# Patient Record
Sex: MALE | Race: WHITE | NOT HISPANIC OR LATINO | Employment: OTHER | URBAN - METROPOLITAN AREA
[De-identification: names, ages, dates, MRNs, and addresses within clinical notes are randomized per-mention and may not be internally consistent; named-entity substitution may affect disease eponyms.]

---

## 2021-03-08 ENCOUNTER — TELEPHONE (OUTPATIENT)
Dept: INTERNAL MEDICINE | Facility: CLINIC | Age: 46
End: 2021-03-08

## 2021-03-08 DIAGNOSIS — Z12.5 SCREENING FOR PROSTATE CANCER: ICD-10-CM

## 2021-03-08 DIAGNOSIS — Z00.00 WELLNESS EXAMINATION: Primary | ICD-10-CM

## 2021-03-08 DIAGNOSIS — Z11.59 NEED FOR HEPATITIS C SCREENING TEST: ICD-10-CM

## 2021-03-23 RX ORDER — FLUTICASONE PROPIONATE 50 MCG
2 SPRAY, SUSPENSION (ML) NASAL DAILY
COMMUNITY
End: 2021-04-22

## 2021-03-23 RX ORDER — HYDROCODONE BITARTRATE AND ACETAMINOPHEN 5; 325 MG/1; MG/1
1 TABLET ORAL EVERY 12 HOURS
COMMUNITY
End: 2021-04-22

## 2021-03-23 RX ORDER — ACETAMINOPHEN 500 MG
500 TABLET ORAL EVERY 6 HOURS PRN
COMMUNITY
End: 2021-04-22

## 2021-03-23 RX ORDER — CETIRIZINE HYDROCHLORIDE 10 MG/1
10 TABLET ORAL DAILY
COMMUNITY

## 2021-04-21 LAB
ALBUMIN SERPL-MCNC: 4.6 G/DL (ref 3.5–5.2)
ALBUMIN/GLOB SERPL: 2.1 G/DL
ALP SERPL-CCNC: 66 U/L (ref 39–117)
ALT SERPL-CCNC: 23 U/L (ref 1–41)
AST SERPL-CCNC: 22 U/L (ref 1–40)
BILIRUB SERPL-MCNC: 0.5 MG/DL (ref 0–1.2)
BUN SERPL-MCNC: 17 MG/DL (ref 6–20)
BUN/CREAT SERPL: 17.5 (ref 7–25)
CALCIUM SERPL-MCNC: 9.4 MG/DL (ref 8.6–10.5)
CHLORIDE SERPL-SCNC: 101 MMOL/L (ref 98–107)
CHOLEST SERPL-MCNC: 218 MG/DL (ref 0–200)
CHOLEST/HDLC SERPL: 4.64 {RATIO}
CO2 SERPL-SCNC: 29.2 MMOL/L (ref 22–29)
CREAT SERPL-MCNC: 0.97 MG/DL (ref 0.76–1.27)
GLOBULIN SER CALC-MCNC: 2.2 GM/DL
GLUCOSE SERPL-MCNC: 104 MG/DL (ref 65–99)
HCV AB S/CO SERPL IA: <0.1 S/CO RATIO (ref 0–0.9)
HDLC SERPL-MCNC: 47 MG/DL (ref 40–60)
LDLC SERPL CALC-MCNC: 148 MG/DL (ref 0–100)
POTASSIUM SERPL-SCNC: 4.2 MMOL/L (ref 3.5–5.2)
PROT SERPL-MCNC: 6.8 G/DL (ref 6–8.5)
PSA SERPL-MCNC: 1.13 NG/ML (ref 0–4)
SODIUM SERPL-SCNC: 140 MMOL/L (ref 136–145)
TRIGL SERPL-MCNC: 129 MG/DL (ref 0–150)
VLDLC SERPL CALC-MCNC: 23 MG/DL (ref 5–40)

## 2021-04-22 ENCOUNTER — OFFICE VISIT (OUTPATIENT)
Dept: INTERNAL MEDICINE | Facility: CLINIC | Age: 46
End: 2021-04-22

## 2021-04-22 VITALS
SYSTOLIC BLOOD PRESSURE: 90 MMHG | HEIGHT: 71 IN | TEMPERATURE: 97.1 F | BODY MASS INDEX: 25.9 KG/M2 | HEART RATE: 66 BPM | DIASTOLIC BLOOD PRESSURE: 68 MMHG | WEIGHT: 185 LBS

## 2021-04-22 DIAGNOSIS — Z12.11 SCREENING FOR COLON CANCER: ICD-10-CM

## 2021-04-22 DIAGNOSIS — C44.90 SKIN CANCER: ICD-10-CM

## 2021-04-22 DIAGNOSIS — Z00.00 WELLNESS EXAMINATION: Primary | ICD-10-CM

## 2021-04-22 PROCEDURE — 90471 IMMUNIZATION ADMIN: CPT | Performed by: INTERNAL MEDICINE

## 2021-04-22 PROCEDURE — 99386 PREV VISIT NEW AGE 40-64: CPT | Performed by: INTERNAL MEDICINE

## 2021-04-22 PROCEDURE — 90715 TDAP VACCINE 7 YRS/> IM: CPT | Performed by: INTERNAL MEDICINE

## 2021-04-22 NOTE — PROGRESS NOTES
Subjective        Chief Complaint   Patient presents with   • hospitals Care           Ben Mata is a 45 y.o. male who presents for    There is no problem list on file for this patient.      History of Present Illness      He had a melanoma in situ removed with Dr. Tate.  He retired from the [employer]. He feels healthier now than he has in a long time. He denies depression or anxiety.  He exercises 5-6 days per week.  Allergies   Allergen Reactions   • Ceclor [Cefaclor] Dizziness       Current Outpatient Medications on File Prior to Visit   Medication Sig Dispense Refill   • cetirizine (zyrTEC) 10 MG tablet Take 10 mg by mouth Daily.     • EPINEPHrine (EPIPEN 2-HILDA IJ) Inject  as directed.     • [DISCONTINUED] acetaminophen (Acetaminophen Extra Strength) 500 MG tablet Take 500 mg by mouth Every 6 (Six) Hours As Needed for Mild Pain .     • [DISCONTINUED] fluticasone (FLONASE) 50 MCG/ACT nasal spray 2 sprays into the nostril(s) as directed by provider Daily.     • [DISCONTINUED] HYDROcodone-acetaminophen (NORCO) 5-325 MG per tablet Take 1 tablet by mouth Every 12 (Twelve) Hours.       No current facility-administered medications on file prior to visit.       Past Medical History:   Diagnosis Date   • Anxiety    • Bee sting allergy    • Melanoma in situ (CMS/[facility])        Past Surgical History:   Procedure Laterality Date   • EAR TUBES     • SKIN BIOPSY         Family History   Problem Relation Age of Onset   • Heart disease Mother         pacemaker   • Diabetes Son    • Heart disease Maternal Grandmother    • Stroke Maternal Grandfather    • Heart disease Paternal Grandmother        Social History     Socioeconomic History   • Marital status:      Spouse name: Not on file   • Number of children: Not on file   • Years of education: Not on file   • Highest education level: Not on file   Tobacco Use   • Smoking status: Never Smoker   • Smokeless tobacco: Never Used   Vaping Use   • Vaping Use: Never used  "  Substance and Sexual Activity   • Alcohol use: Yes     Comment: 2 ounces twice per week   • Drug use: Never   • Sexual activity: Not Currently           The following portions of the patient's history were reviewed and updated as appropriate: problem list, allergies, current medications, past medical history, past family history, past social history and past surgical history.    Review of Systems   Constitutional: Negative for chills, fever and unexpected weight loss.   HENT: Negative for postnasal drip and sore throat.    Eyes: Negative for blurred vision.   Respiratory: Negative for cough, shortness of breath and wheezing.    Cardiovascular: Negative for chest pain and leg swelling.   Gastrointestinal: Negative for abdominal pain, blood in stool, nausea, vomiting and GERD.   Endocrine: Negative for polyuria.   Genitourinary: Negative for dysuria, frequency and hematuria.   Musculoskeletal: Negative for gait problem.   Skin: Negative for rash.   Allergic/Immunologic: Negative for immunocompromised state.   Neurological: Negative for weakness.   Hematological: Does not bruise/bleed easily.   Psychiatric/Behavioral: Negative for depressed mood. The patient is not nervous/anxious.        Immunization History   Administered Date(s) Administered   • Hep B, Unspecified 03/01/1996   • Hepatitis A 12/16/2002   • Hepatitis B 01/20/2011   • Tdap 01/02/2011, 04/22/2021       Objective   Vitals:    04/22/21 0951   BP: 90/68   Pulse: 66   Temp: 97.1 °F (36.2 °C)   Weight: 83.9 kg (185 lb)   Height: 180.3 cm (71\")     Body mass index is 25.8 kg/m².  Physical Exam  Vitals reviewed.   Constitutional:       Appearance: He is well-developed.   HENT:      Head: Normocephalic and atraumatic.      Mouth/Throat:      Mouth: Mucous membranes are moist.      Pharynx: Oropharynx is clear.   Eyes:      Extraocular Movements: Extraocular movements intact.      Conjunctiva/sclera: Conjunctivae normal.      Pupils: Pupils are equal, round, " and reactive to light.   Neck:      Thyroid: No thyromegaly.      Vascular: No carotid bruit.   Cardiovascular:      Rate and Rhythm: Normal rate and regular rhythm.      Heart sounds: Normal heart sounds. No murmur heard.     Pulmonary:      Effort: Pulmonary effort is normal.      Breath sounds: Normal breath sounds.   Abdominal:      General: There is no distension.      Palpations: Abdomen is soft. There is no mass.      Tenderness: There is no abdominal tenderness. There is no rebound.      Hernia: There is no hernia in the left inguinal area or right inguinal area.   Genitourinary:     Penis: Normal.       Testes: Normal.   Musculoskeletal:      Cervical back: Neck supple.   Lymphadenopathy:      Cervical: No cervical adenopathy.   Skin:     General: Skin is warm.   Neurological:      Mental Status: He is alert.   Psychiatric:         Behavior: Behavior normal.         Procedures    Assessment/Plan   Diagnoses and all orders for this visit:    1. Wellness examination (Primary)    2. Skin cancer  -     Ambulatory Referral to Dermatology    3. Screening for colon cancer  -     Ambulatory Referral For Screening Colonoscopy    Other orders  -     Tdap Vaccine Greater Than or Equal To 8yo IM        Labs reviewed. Sign for old records.     Tdap today. Set up screening cscope. He exercises well over 150 minutes per week. Get into derm. Filled out form for scouting hike this summer; no contraindications. He will let me know if needs another epi pen.    No follow-ups on file.

## 2021-05-26 ENCOUNTER — PREP FOR SURGERY (OUTPATIENT)
Dept: SURGERY | Facility: SURGERY CENTER | Age: 46
End: 2021-05-26

## 2021-05-26 DIAGNOSIS — Z12.11 ENCOUNTER FOR SCREENING FOR MALIGNANT NEOPLASM OF COLON: Primary | ICD-10-CM

## 2021-05-26 RX ORDER — SODIUM CHLORIDE 0.9 % (FLUSH) 0.9 %
3 SYRINGE (ML) INJECTION EVERY 12 HOURS SCHEDULED
Status: CANCELLED | OUTPATIENT
Start: 2021-05-26

## 2021-05-26 RX ORDER — SODIUM CHLORIDE, SODIUM LACTATE, POTASSIUM CHLORIDE, CALCIUM CHLORIDE 600; 310; 30; 20 MG/100ML; MG/100ML; MG/100ML; MG/100ML
30 INJECTION, SOLUTION INTRAVENOUS CONTINUOUS PRN
Status: CANCELLED | OUTPATIENT
Start: 2021-05-26

## 2021-05-26 RX ORDER — SODIUM CHLORIDE 0.9 % (FLUSH) 0.9 %
10 SYRINGE (ML) INJECTION AS NEEDED
Status: CANCELLED | OUTPATIENT
Start: 2021-05-26

## 2021-09-14 ENCOUNTER — OFFICE VISIT (OUTPATIENT)
Dept: INTERNAL MEDICINE | Facility: CLINIC | Age: 46
End: 2021-09-14

## 2021-09-14 VITALS — WEIGHT: 187 LBS | HEIGHT: 71 IN | TEMPERATURE: 97.8 F | BODY MASS INDEX: 26.18 KG/M2

## 2021-09-14 DIAGNOSIS — C43.22 MALIGNANT MELANOMA OF LEFT PINNA (HCC): ICD-10-CM

## 2021-09-14 DIAGNOSIS — M54.2 NECK PAIN: Primary | ICD-10-CM

## 2021-09-14 PROCEDURE — 99213 OFFICE O/P EST LOW 20 MIN: CPT | Performed by: INTERNAL MEDICINE

## 2021-09-14 RX ORDER — MELOXICAM 7.5 MG/1
7.5 TABLET ORAL DAILY
Qty: 30 TABLET | Refills: 0 | Status: SHIPPED | OUTPATIENT
Start: 2021-09-14 | End: 2023-02-22

## 2021-09-14 RX ORDER — CYCLOBENZAPRINE HCL 10 MG
10 TABLET ORAL NIGHTLY PRN
Qty: 21 TABLET | Refills: 0 | Status: SHIPPED | OUTPATIENT
Start: 2021-09-14 | End: 2023-02-22

## 2021-09-14 NOTE — PROGRESS NOTES
Subjective        Chief Complaint   Patient presents with   • Neck Pain           Ben Mata is a 46 y.o. male who presents for    Patient Active Problem List   Diagnosis   • Encounter for screening for malignant neoplasm of colon       History of Present Illness     He has left neck pain since last OV. It is a sharp pain. It is worse with moving it. It is not worse with sleeping. He denies weakness, numbness or tingling.   Allergies   Allergen Reactions   • Ceclor [Cefaclor] Dizziness       Current Outpatient Medications on File Prior to Visit   Medication Sig Dispense Refill   • cetirizine (zyrTEC) 10 MG tablet Take 10 mg by mouth Daily.     • EPINEPHrine (EPIPEN 2-HILDA IJ) Inject  as directed.       No current facility-administered medications on file prior to visit.       Past Medical History:   Diagnosis Date   • Anxiety    • Bee sting allergy    • Melanoma in situ (CMS/[facility])        Past Surgical History:   Procedure Laterality Date   • EAR TUBES     • SKIN BIOPSY         Family History   Problem Relation Age of Onset   • Heart disease Mother         pacemaker   • Diabetes Son    • Heart disease Maternal Grandmother    • Stroke Maternal Grandfather    • Heart disease Paternal Grandmother        Social History     Socioeconomic History   • Marital status:      Spouse name: Not on file   • Number of children: Not on file   • Years of education: Not on file   • Highest education level: Not on file   Tobacco Use   • Smoking status: Never Smoker   • Smokeless tobacco: Never Used   Vaping Use   • Vaping Use: Never used   Substance and Sexual Activity   • Alcohol use: Yes     Comment: 2 ounces twice per week   • Drug use: Never   • Sexual activity: Not Currently           The following portions of the patient's history were reviewed and updated as appropriate: problem list, allergies, current medications, past medical history, past family history, past social history and past surgical history.    Review  "of Systems    Immunization History   Administered Date(s) Administered   • Hep B, Unspecified 03/01/1996   • Hepatitis A 12/16/2002   • Hepatitis B 01/20/2011   • Tdap 01/02/2011, 04/22/2021       Objective   Vitals:    09/14/21 1514   Temp: 97.8 °F (36.6 °C)   Weight: 84.8 kg (187 lb)   Height: 180.3 cm (71\")     Body mass index is 26.08 kg/m².  Physical Exam  Constitutional:       Appearance: Normal appearance.   Lymphadenopathy:      Cervical:      Right cervical: No superficial cervical adenopathy.     Left cervical: No superficial cervical adenopathy.   Neurological:      Mental Status: He is alert and oriented to person, place, and time.      Deep Tendon Reflexes:      Reflex Scores:       Bicep reflexes are 1+ on the right side and 1+ on the left side.       Brachioradialis reflexes are 1+ on the right side and 1+ on the left side.     Comments: 5/5 strength in her arms   Psychiatric:         Mood and Affect: Mood normal.         Procedures    Assessment/Plan   Diagnoses and all orders for this visit:    1. Neck pain (Primary)  -     MRI Cervical Spine With Contrast; Future  -     meloxicam (Mobic) 7.5 MG tablet; Take 1 tablet by mouth Daily.  Dispense: 30 tablet; Refill: 0  -     cyclobenzaprine (FLEXERIL) 10 MG tablet; Take 1 tablet by mouth At Night As Needed for Muscle Spasms.  Dispense: 21 tablet; Refill: 0    2. Malignant melanoma of left pinna (CMS/HCC)  -     MRI Cervical Spine With Contrast; Future               He has a cscope set up for November. He has decided not to get the COVID shots.    Set up MRI neck given h/o melanoma.  No follow-ups on file.  "

## 2021-10-12 ENCOUNTER — HOSPITAL ENCOUNTER (OUTPATIENT)
Dept: MRI IMAGING | Facility: HOSPITAL | Age: 46
Discharge: HOME | End: 2021-10-12
Admitting: INTERNAL MEDICINE

## 2021-10-12 DIAGNOSIS — C43.22 MALIGNANT MELANOMA OF LEFT PINNA (HCC): ICD-10-CM

## 2021-10-12 DIAGNOSIS — M54.2 NECK PAIN: ICD-10-CM

## 2021-10-12 PROCEDURE — 0 GADOBENATE DIMEGLUMINE 529 MG/ML SOLUTION: Performed by: INTERNAL MEDICINE

## 2021-10-12 PROCEDURE — 72156 MRI NECK SPINE W/O & W/DYE: CPT

## 2021-10-12 PROCEDURE — A9577 INJ MULTIHANCE: HCPCS | Performed by: INTERNAL MEDICINE

## 2021-10-12 RX ADMIN — GADOBENATE DIMEGLUMINE 17 ML: 529 INJECTION, SOLUTION INTRAVENOUS at 08:40

## 2021-11-08 NOTE — SIGNIFICANT NOTE
Education provided the Patient on the following:    - Nothing to Eat or Drink after MN the night before the procedure    - Avoid red/purple fluids while completing their bowel prep as ordered by physician  -Contact Gastrointerologist office for any questions about specific details regarding colon prep    -You will need to have someone drive you home after your colonoscopy and remain with you for 24 hours after the procedure  - The date of your Surgery, you may have one visitor at bedside or within 10-15 minutes of [facility]  -Please wear warm socks when you arrive for your colonoscopy  -Remove all jewelry and leave any valuables before arriving the day of your procedure (all will have to be removed before leaving preop)  -You will need to arrive at 0600 on 11/11/21 for your colonoscopy    -Feel free to contact us at: [phone] with any additional questions/concerns

## 2021-11-11 ENCOUNTER — ANESTHESIA (OUTPATIENT)
Dept: SURGERY | Facility: SURGERY CENTER | Age: 46
End: 2021-11-11

## 2021-11-11 ENCOUNTER — ANESTHESIA EVENT (OUTPATIENT)
Dept: SURGERY | Facility: SURGERY CENTER | Age: 46
End: 2021-11-11

## 2021-11-11 ENCOUNTER — HOSPITAL ENCOUNTER (OUTPATIENT)
Facility: SURGERY CENTER | Age: 46
Setting detail: HOSPITAL OUTPATIENT SURGERY
Discharge: HOME | End: 2021-11-11
Attending: INTERNAL MEDICINE | Admitting: INTERNAL MEDICINE

## 2021-11-11 VITALS
HEIGHT: 71 IN | BODY MASS INDEX: 25.2 KG/M2 | SYSTOLIC BLOOD PRESSURE: 97 MMHG | OXYGEN SATURATION: 98 % | RESPIRATION RATE: 16 BRPM | TEMPERATURE: 98 F | DIASTOLIC BLOOD PRESSURE: 69 MMHG | WEIGHT: 180 LBS | HEART RATE: 80 BPM

## 2021-11-11 DIAGNOSIS — Z12.11 ENCOUNTER FOR SCREENING FOR MALIGNANT NEOPLASM OF COLON: ICD-10-CM

## 2021-11-11 PROCEDURE — 25010000002 PROPOFOL 10 MG/ML EMULSION: Performed by: ANESTHESIOLOGY

## 2021-11-11 PROCEDURE — 45378 DIAGNOSTIC COLONOSCOPY: CPT | Performed by: INTERNAL MEDICINE

## 2021-11-11 PROCEDURE — 0DJD8ZZ INSPECTION OF LOWER INTESTINAL TRACT, VIA NATURAL OR ARTIFICIAL OPENING ENDOSCOPIC: ICD-10-PCS | Performed by: INTERNAL MEDICINE

## 2021-11-11 RX ORDER — LIDOCAINE HYDROCHLORIDE 20 MG/ML
INJECTION, SOLUTION INFILTRATION; PERINEURAL AS NEEDED
Status: DISCONTINUED | OUTPATIENT
Start: 2021-11-11 | End: 2021-11-11 | Stop reason: SURG

## 2021-11-11 RX ORDER — SODIUM CHLORIDE, SODIUM LACTATE, POTASSIUM CHLORIDE, CALCIUM CHLORIDE 600; 310; 30; 20 MG/100ML; MG/100ML; MG/100ML; MG/100ML
1000 INJECTION, SOLUTION INTRAVENOUS CONTINUOUS
Status: DISCONTINUED | OUTPATIENT
Start: 2021-11-11 | End: 2021-11-11 | Stop reason: HOSPADM

## 2021-11-11 RX ORDER — PROPOFOL 10 MG/ML
VIAL (ML) INTRAVENOUS CONTINUOUS PRN
Status: DISCONTINUED | OUTPATIENT
Start: 2021-11-11 | End: 2021-11-11 | Stop reason: SURG

## 2021-11-11 RX ORDER — SODIUM CHLORIDE 0.9 % (FLUSH) 0.9 %
10 SYRINGE (ML) INJECTION AS NEEDED
Status: DISCONTINUED | OUTPATIENT
Start: 2021-11-11 | End: 2021-11-11 | Stop reason: HOSPADM

## 2021-11-11 RX ORDER — ONDANSETRON 2 MG/ML
4 INJECTION INTRAMUSCULAR; INTRAVENOUS ONCE AS NEEDED
Status: DISCONTINUED | OUTPATIENT
Start: 2021-11-11 | End: 2021-11-11 | Stop reason: HOSPADM

## 2021-11-11 RX ORDER — PROPOFOL 10 MG/ML
VIAL (ML) INTRAVENOUS AS NEEDED
Status: DISCONTINUED | OUTPATIENT
Start: 2021-11-11 | End: 2021-11-11 | Stop reason: SURG

## 2021-11-11 RX ORDER — SODIUM CHLORIDE 0.9 % (FLUSH) 0.9 %
3 SYRINGE (ML) INJECTION EVERY 12 HOURS SCHEDULED
Status: DISCONTINUED | OUTPATIENT
Start: 2021-11-11 | End: 2021-11-11 | Stop reason: HOSPADM

## 2021-11-11 RX ORDER — LIDOCAINE HYDROCHLORIDE 10 MG/ML
0.5 INJECTION, SOLUTION INFILTRATION; PERINEURAL ONCE AS NEEDED
Status: DISCONTINUED | OUTPATIENT
Start: 2021-11-11 | End: 2021-11-11 | Stop reason: HOSPADM

## 2021-11-11 RX ADMIN — LIDOCAINE HYDROCHLORIDE 40 MG: 20 INJECTION, SOLUTION INFILTRATION; PERINEURAL at 07:37

## 2021-11-11 RX ADMIN — PROPOFOL 40 MG: 10 INJECTION, EMULSION INTRAVENOUS at 07:40

## 2021-11-11 RX ADMIN — Medication 180 MCG/KG/MIN: at 07:37

## 2021-11-11 RX ADMIN — SODIUM CHLORIDE, POTASSIUM CHLORIDE, SODIUM LACTATE AND CALCIUM CHLORIDE 1000 ML: 600; 310; 30; 20 INJECTION, SOLUTION INTRAVENOUS at 06:27

## 2021-11-11 RX ADMIN — PROPOFOL 100 MG: 10 INJECTION, EMULSION INTRAVENOUS at 07:37

## 2021-11-11 NOTE — H&P
No chief complaint on file.      HPI  Patient here today for screening colonoscopy.         Problem List:    Patient Active Problem List   Diagnosis   • Encounter for screening for malignant neoplasm of colon       Medical History:    Past Medical History:   Diagnosis Date   • Anxiety    • Arthritis    • Bee sting allergy    • Bulging of lumbar intervertebral disc    • Melanoma in situ (HCC)         Social History:    Social History     Socioeconomic History   • Marital status:    Tobacco Use   • Smoking status: Never Smoker   • Smokeless tobacco: Never Used   Vaping Use   • Vaping Use: Never used   Substance and Sexual Activity   • Alcohol use: Yes     Comment: 2 ounces twice per week   • Drug use: Never   • Sexual activity: Not Currently       Family History:   Family History   Problem Relation Age of Onset   • Heart disease Mother         pacemaker   • Diabetes Son    • Heart disease Maternal Grandmother    • Stroke Maternal Grandfather    • Heart disease Paternal Grandmother        Surgical History:   Past Surgical History:   Procedure Laterality Date   • EAR TUBES     • SKIN BIOPSY           Current Facility-Administered Medications:   •  lactated ringers infusion 1,000 mL, 1,000 mL, Intravenous, Continuous, Hermilo German MD, Last Rate: 25 mL/hr at 11/11/21 0627, 1,000 mL at 11/11/21 0627  •  lidocaine (XYLOCAINE) 1 % injection 0.5 mL, 0.5 mL, Intradermal, Once PRN, Hermilo German MD  •  sodium chloride 0.9 % flush 10 mL, 10 mL, Intravenous, PRN, Hermilo German MD  •  sodium chloride 0.9 % flush 3 mL, 3 mL, Intravenous, Q12H, Hermilo German MD    Allergies:   Allergies   Allergen Reactions   • Ceclor [Cefaclor] Dizziness        The following portions of the patient's history were reviewed by me and updated as appropriate: review of systems, allergies, current medications, past family history, past medical history, past social history, past surgical history and problem  list.    Vitals:    11/11/21 0627   BP: 108/81   Pulse: 71   Resp: 16   Temp: 97 °F (36.1 °C)   SpO2: 98%       PHYSICAL EXAM:    CONSTITUTIONAL:  today's vital signs reviewed by me  GASTROINTESTINAL: abdomen is soft nontender nondistended with normal active bowel sounds, no masses are appreciated    Assessment/ Plan  We will proceed today with screening colonoscopy.    Risks and benefits as well as alternatives to endoscopic evaluation were explained to the patient and they voiced understanding and wish to proceed.  These risks include but are not limited to the risk of bleeding, perforation, adverse reaction to sedation, and missed lesions.  The patient was given the opportunity to ask questions prior to the endoscopic procedure.

## 2021-11-11 NOTE — ANESTHESIA POSTPROCEDURE EVALUATION
"Patient: Ben Mata    Procedure Summary     Date: 11/11/21 Room / Location: [facility]    Anesthesia Start: 0731 Anesthesia Stop: 0747    Procedure: COLONOSCOPY Aborted due to poor prep (N/A ) Diagnosis:       Encounter for screening for malignant neoplasm of colon      (Encounter for screening for malignant neoplasm of colon [Z12.11])    Surgeons: Hermilo German MD Provider: Divina Cao MD    Anesthesia Type: MAC ASA Status: 1          Anesthesia Type: MAC    Vitals  Vitals Value Taken Time   BP 97/79 11/11/21 0800   Temp 36.7 °C (98 °F) 11/11/21 0747   Pulse 72 11/11/21 0800   Resp 16 11/11/21 0800   SpO2 98 % 11/11/21 0800           Post Anesthesia Care and Evaluation    Patient location during evaluation: PHASE II  Patient participation: complete - patient participated  Level of consciousness: awake  Pain management: adequate  Airway patency: patent  Anesthetic complications: No anesthetic complications    Cardiovascular status: acceptable  Respiratory status: acceptable  Hydration status: acceptable    Comments: BP 97/79 (BP Location: Left arm, Patient Position: Lying)   Pulse 72   Temp 36.7 °C (98 °F) (Temporal)   Resp 16   Ht 180.3 cm (71\")   Wt 81.6 kg (180 lb)   SpO2 98%   BMI 25.10 kg/m²       "

## 2021-11-11 NOTE — ANESTHESIA PREPROCEDURE EVALUATION
Anesthesia Evaluation     Patient summary reviewed and Nursing notes reviewed   NPO Solid Status: > 8 hours  NPO Liquid Status: > 2 hours           Airway   Mallampati: I  Dental - normal exam     Pulmonary - negative pulmonary ROS and normal exam   Cardiovascular - negative cardio ROS and normal exam        Neuro/Psych  (+) psychiatric history Anxiety,     GI/Hepatic/Renal/Endo - negative ROS     Musculoskeletal (-) negative ROS    Abdominal    Substance History - negative use     OB/GYN          Other      history of cancer                    Anesthesia Plan    ASA 1     MAC       Anesthetic plan, all risks, benefits, and alternatives have been provided, discussed and informed consent has been obtained with: patient.

## 2021-11-17 ENCOUNTER — PREP FOR SURGERY (OUTPATIENT)
Dept: SURGERY | Facility: SURGERY CENTER | Age: 46
End: 2021-11-17

## 2021-11-17 DIAGNOSIS — Z12.11 ENCOUNTER FOR SCREENING FOR MALIGNANT NEOPLASM OF COLON: Primary | ICD-10-CM

## 2021-11-17 RX ORDER — SODIUM CHLORIDE, SODIUM LACTATE, POTASSIUM CHLORIDE, CALCIUM CHLORIDE 600; 310; 30; 20 MG/100ML; MG/100ML; MG/100ML; MG/100ML
30 INJECTION, SOLUTION INTRAVENOUS CONTINUOUS PRN
Status: CANCELLED | OUTPATIENT
Start: 2021-11-17

## 2022-04-21 ENCOUNTER — TELEPHONE (OUTPATIENT)
Dept: INTERNAL MEDICINE | Facility: CLINIC | Age: 47
End: 2022-04-21

## 2022-04-21 NOTE — TELEPHONE ENCOUNTER
Patient came in this morning to have his labs drawn for his physical on April 28, 2022 and there were no labs, patient upset, canceled his physical appointment.

## 2022-04-29 ENCOUNTER — NURSE TRIAGE (OUTPATIENT)
Dept: CALL CENTER | Facility: HOSPITAL | Age: 47
End: 2022-04-29

## 2022-04-30 NOTE — TELEPHONE ENCOUNTER
"Caller states that patient started feeling bad after eating last evening; diarrhea started with extreme belching and bloating; difficulty breathing with the feeling of smothering reported; instruction provided per protocol    Reason for Disposition  • [1] Pain lasts > 10 minutes AND [2] difficulty breathing    Additional Information  • Negative: SEVERE difficulty breathing (e.g., struggling for each breath, speaks in single words)  • Negative: Shock suspected (e.g., cold/pale/clammy skin, too weak to stand, low BP, rapid pulse)  • Negative: Difficult to awaken or acting confused (e.g., disoriented, slurred speech)  • Negative: Passed out (i.e., lost consciousness, collapsed and was not responding)  • Negative: Visible sweat on face or sweat dripping down face  • Negative: Sounds like a life-threatening emergency to the triager  • Negative: Followed an abdomen (stomach) injury  • Negative: Chest pain  • Negative: [1] SEVERE pain (e.g., excruciating) AND [2] present > 1 hour  • Negative: [1] Pain lasts > 10 minutes AND [2] age > 50  • Negative: [1] Pain lasts > 10 minutes AND [2] age > 40 AND [3] associated chest, arm, neck, upper back or jaw pain  • Negative: [1] Pain lasts > 10 minutes AND [2] age > 35 AND [3] at least one cardiac risk factor (i.e., hypertension, diabetes, obesity, smoker or strong family history of heart disease)  • Negative: [1] Pain lasts > 10 minutes AND [2] history of heart disease (i.e., heart attack, bypass surgery, angina, angioplasty, CHF; not just a heart murmur)    Answer Assessment - Initial Assessment Questions  1. LOCATION: \"Where does it hurt?\"       Mid abdomen  2. RADIATION: \"Does the pain shoot anywhere else?\" (e.g., chest, back)      Denies radiation  3. ONSET: \"When did the pain begin?\" (e.g., minutes, hours or days ago)       Since last evening  4. SUDDEN: \"Gradual or sudden onset?\"      Sudden onset  5. PATTERN \"Does the pain come and go, or is it constant?\"     - If constant: " "\"Is it getting better, staying the same, or worsening?\"       (Note: Constant means the pain never goes away completely; most serious pain is constant and it progresses)      - If intermittent: \"How long does it last?\" \"Do you have pain now?\"      (Note: Intermittent means the pain goes away completely between bouts)      Worsening   6. SEVERITY: \"How bad is the pain?\"  (e.g., Scale 1-10; mild, moderate, or severe)     - MILD (1-3): doesn't interfere with normal activities, abdomen soft and not tender to touch      - MODERATE (4-7): interferes with normal activities or awakens from sleep, tender to touch      - SEVERE (8-10): excruciating pain, doubled over, unable to do any normal activities        Moderate   7. RECURRENT SYMPTOM: \"Have you ever had this type of stomach pain before?\" If Yes, ask: \"When was the last time?\" and \"What happened that time?\"       Denies  8. AGGRAVATING FACTORS: \"Does anything seem to cause this pain?\" (e.g., foods, stress, alcohol)      NA  9. CARDIAC SYMPTOMS: \"Do you have any of the following symptoms: chest pain, difficulty breathing, sweating, nausea?\"      Difficulty breathing \"feels like he is smothering\"  10. OTHER SYMPTOMS: \"Do you have any other symptoms?\" (e.g., fever, vomiting, diarrhea)        Diarrhea and nausea \"extreme belching\" with bloating  11. PREGNANCY: \"Is there any chance you are pregnant?\" \"When was your last menstrual period?\"        NA    Protocols used: ABDOMINAL PAIN - UPPER-ADULT-AH      "

## 2023-01-18 ENCOUNTER — TELEPHONE (OUTPATIENT)
Dept: INTERNAL MEDICINE | Facility: CLINIC | Age: 48
End: 2023-01-18

## 2023-01-18 DIAGNOSIS — Z12.5 SCREENING FOR PROSTATE CANCER: ICD-10-CM

## 2023-01-18 DIAGNOSIS — Z00.00 WELLNESS EXAMINATION: Primary | ICD-10-CM

## 2023-01-18 NOTE — TELEPHONE ENCOUNTER
Caller: Ben Mata    Relationship: Self    Best call back number: [phone]    What orders are you requesting (i.e. lab or imaging): ROUTINE LABS    In what timeframe would the patient need to come in: BEFORE HIS 02/22/2023 APPOINTMENT    Where will you receive your lab/imaging services: HOSPITAL OR [facility] - THE PATIENT WILL NEED THE LAB ORDERS RELEASED BEFORE HE HAS THEM DONE.

## 2023-02-18 LAB
ALBUMIN SERPL-MCNC: 4.5 G/DL (ref 4–5)
ALBUMIN/GLOB SERPL: 2.3 {RATIO} (ref 1.2–2.2)
ALP SERPL-CCNC: 66 IU/L (ref 44–121)
ALT SERPL-CCNC: 20 IU/L (ref 0–44)
AST SERPL-CCNC: 20 IU/L (ref 0–40)
BILIRUB SERPL-MCNC: 0.3 MG/DL (ref 0–1.2)
BUN SERPL-MCNC: 19 MG/DL (ref 6–24)
BUN/CREAT SERPL: 17 (ref 9–20)
CALCIUM SERPL-MCNC: 9 MG/DL (ref 8.7–10.2)
CHLORIDE SERPL-SCNC: 103 MMOL/L (ref 96–106)
CHOLEST SERPL-MCNC: 211 MG/DL (ref 100–199)
CHOLEST/HDLC SERPL: 5 RATIO (ref 0–5)
CO2 SERPL-SCNC: 25 MMOL/L (ref 20–29)
CREAT SERPL-MCNC: 1.11 MG/DL (ref 0.76–1.27)
EGFRCR SERPLBLD CKD-EPI 2021: 81 ML/MIN/1.73
GLOBULIN SER CALC-MCNC: 2 G/DL (ref 1.5–4.5)
GLUCOSE SERPL-MCNC: 109 MG/DL (ref 70–99)
HDLC SERPL-MCNC: 42 MG/DL
LDLC SERPL CALC-MCNC: 149 MG/DL (ref 0–99)
POTASSIUM SERPL-SCNC: 4.5 MMOL/L (ref 3.5–5.2)
PROT SERPL-MCNC: 6.5 G/DL (ref 6–8.5)
PSA SERPL-MCNC: 1 NG/ML (ref 0–4)
SODIUM SERPL-SCNC: 140 MMOL/L (ref 134–144)
TRIGL SERPL-MCNC: 109 MG/DL (ref 0–149)
VLDLC SERPL CALC-MCNC: 20 MG/DL (ref 5–40)

## 2023-02-22 ENCOUNTER — OFFICE VISIT (OUTPATIENT)
Dept: INTERNAL MEDICINE | Facility: CLINIC | Age: 48
End: 2023-02-22
Payer: OTHER GOVERNMENT

## 2023-02-22 ENCOUNTER — PREP FOR SURGERY (OUTPATIENT)
Dept: SURGERY | Facility: SURGERY CENTER | Age: 48
End: 2023-02-22
Payer: OTHER GOVERNMENT

## 2023-02-22 VITALS
BODY MASS INDEX: 26.21 KG/M2 | SYSTOLIC BLOOD PRESSURE: 122 MMHG | HEIGHT: 71 IN | TEMPERATURE: 98.1 F | DIASTOLIC BLOOD PRESSURE: 82 MMHG | OXYGEN SATURATION: 98 % | WEIGHT: 187.2 LBS | HEART RATE: 63 BPM

## 2023-02-22 DIAGNOSIS — R73.9 HYPERGLYCEMIA: ICD-10-CM

## 2023-02-22 DIAGNOSIS — H93.8X3 PRESSURE SENSATION IN BOTH EARS: ICD-10-CM

## 2023-02-22 DIAGNOSIS — Z12.11 SCREENING FOR COLON CANCER: ICD-10-CM

## 2023-02-22 DIAGNOSIS — Z12.11 ENCOUNTER FOR SCREENING FOR MALIGNANT NEOPLASM OF COLON: Primary | ICD-10-CM

## 2023-02-22 DIAGNOSIS — Z12.5 SCREENING FOR PROSTATE CANCER: ICD-10-CM

## 2023-02-22 DIAGNOSIS — Z00.00 WELLNESS EXAMINATION: Primary | ICD-10-CM

## 2023-02-22 PROCEDURE — 99396 PREV VISIT EST AGE 40-64: CPT | Performed by: INTERNAL MEDICINE

## 2023-02-22 RX ORDER — EPINEPHRINE 0.3 MG/.3ML
0.3 INJECTION SUBCUTANEOUS ONCE
Qty: 2 EACH | Refills: 0 | Status: SHIPPED | OUTPATIENT
Start: 2023-02-22 | End: 2023-02-22

## 2023-02-22 RX ORDER — FLUTICASONE PROPIONATE 50 MCG
2 SPRAY, SUSPENSION (ML) NASAL DAILY
Qty: 29.7 ML | Refills: 3 | Status: SHIPPED | OUTPATIENT
Start: 2023-02-22

## 2023-02-22 NOTE — PROGRESS NOTES
Subjective        Chief Complaint   Patient presents with   • Annual Exam     physical   • follow up to labs   • Med Refill     Epi- Pen           Ben Mata is a 47 y.o. male who presents for    Patient Active Problem List   Diagnosis   (none) - all problems resolved or deleted       History of Present Illness     His ears feel full intermittently. He will use Mucinex and it helps. Exercise helps them as well. He had a cscope with stool still in colon in 2021. Zyrtec helps his allergies.  Allergies   Allergen Reactions   • Ceclor [Cefaclor] Dizziness       Current Outpatient Medications on File Prior to Visit   Medication Sig Dispense Refill   • cetirizine (zyrTEC) 10 MG tablet Take 10 mg by mouth Daily.     • [DISCONTINUED] EPINEPHrine (EPIPEN 2-HILDA IJ) Inject  as directed.     • [DISCONTINUED] cyclobenzaprine (FLEXERIL) 10 MG tablet Take 1 tablet by mouth At Night As Needed for Muscle Spasms. 21 tablet 0   • [DISCONTINUED] meloxicam (Mobic) 7.5 MG tablet Take 1 tablet by mouth Daily. 30 tablet 0     No current facility-administered medications on file prior to visit.       Past Medical History:   Diagnosis Date   • Anxiety    • Bee sting allergy    • Bulging of lumbar intervertebral disc    • Melanoma in situ (HCC)        Past Surgical History:   Procedure Laterality Date   • COLONOSCOPY N/A 11/11/2021    Procedure: COLONOSCOPY Aborted due to poor prep;  Surgeon: Hermilo German MD;  Location: [facility] MAIN OR;  Service: Gastroenterology;  Laterality: N/A;  Hemorrhoids, diverticulosis   • EAR TUBES     • SKIN BIOPSY         Family History   Problem Relation Age of Onset   • Heart disease Mother         pacemaker   • Hyperlipidemia Father    • Heart disease Maternal Grandmother    • Stroke Maternal Grandfather    • Heart disease Paternal Grandmother    • Diabetes Son        Social History     Socioeconomic History   • Marital status:    Tobacco Use   • Smoking status: Never   • Smokeless tobacco:  "Never   Vaping Use   • Vaping Use: Never used   Substance and Sexual Activity   • Alcohol use: Yes     Comment: social   • Drug use: Never   • Sexual activity: Not Currently           The following portions of the patient's history were reviewed and updated as appropriate: problem list, allergies, current medications, past medical history, past family history, past social history and past surgical history.    Review of Systems   Constitutional: Negative for chills, fever and unexpected weight loss.   HENT: Negative for postnasal drip and sore throat.         Fluid in ears   Eyes: Negative for blurred vision.   Respiratory: Negative for cough, shortness of breath and wheezing.    Cardiovascular: Negative for chest pain and leg swelling.   Gastrointestinal: Negative for abdominal pain, blood in stool, nausea, vomiting and GERD.   Endocrine: Negative for polyuria.   Genitourinary: Negative for dysuria, frequency and hematuria.   Musculoskeletal: Negative for gait problem.   Skin: Negative for rash.   Allergic/Immunologic: Negative for immunocompromised state.   Neurological: Negative for weakness.   Hematological: Does not bruise/bleed easily.   Psychiatric/Behavioral: Negative for depressed mood. The patient is not nervous/anxious.        Immunization History   Administered Date(s) Administered   • Hep B, Unspecified 03/01/1996   • Hepatitis A 12/16/2002   • Hepatitis B 01/20/2011   • Tdap 01/02/2011, 04/22/2021       Objective   Vitals:    02/22/23 0925   BP: 122/82   Pulse: 63   Temp: 98.1 °F (36.7 °C)   SpO2: 98%   Weight: 84.9 kg (187 lb 3.2 oz)   Height: 180.3 cm (71\")     Body mass index is 26.11 kg/m².  Physical Exam  Vitals reviewed.   Constitutional:       Appearance: He is well-developed.   HENT:      Head: Normocephalic and atraumatic.      Right Ear: Ear canal normal.      Left Ear: Ear canal normal.      Ears:      Comments: Clear fluid behind ears with some scarring     Mouth/Throat:      Mouth: Mucous " membranes are moist.      Pharynx: Oropharynx is clear.   Eyes:      Extraocular Movements: Extraocular movements intact.      Conjunctiva/sclera: Conjunctivae normal.      Pupils: Pupils are equal, round, and reactive to light.   Neck:      Thyroid: No thyromegaly.      Vascular: No carotid bruit.   Cardiovascular:      Rate and Rhythm: Normal rate and regular rhythm.      Heart sounds: Normal heart sounds. No murmur heard.  Pulmonary:      Effort: Pulmonary effort is normal.      Breath sounds: Normal breath sounds.   Abdominal:      General: There is no distension.      Palpations: Abdomen is soft. There is no mass.      Tenderness: There is no abdominal tenderness. There is no rebound.   Musculoskeletal:      Cervical back: Neck supple.   Lymphadenopathy:      Cervical: No cervical adenopathy.   Skin:     General: Skin is warm.   Neurological:      Mental Status: He is alert.   Psychiatric:         Behavior: Behavior normal.         Procedures    Assessment & Plan   Diagnoses and all orders for this visit:    1. Wellness examination (Primary)  -     Comprehensive Metabolic Panel; Future  -     Lipid Panel With / Chol / HDL Ratio; Future    2. Screening for colon cancer  -     Ambulatory Referral For Screening Colonoscopy    3. Screening for prostate cancer  -     PSA Screen; Future    4. Hyperglycemia  -     Hemoglobin A1c; Future    5. Pressure sensation in both ears  -     fluticasone (FLONASE) 50 MCG/ACT nasal spray; 2 sprays into the nostril(s) as directed by provider Daily.  Dispense: 29.7 mL; Refill: 3    Other orders  -     EPINEPHrine (EpiPen 2-Isac) 0.3 MG/0.3ML solution auto-injector injection; Inject 0.3 mL into the appropriate muscle as directed by prescriber 1 (One) Time for 1 dose.  Dispense: 2 each; Refill: 0               Reviewed labs. Set up repeat cscope since he did not take prep for first one. Discussed exercising 150 minutes per week. Declines flu or COVID shots. Encouraged him to f/u derm  given past h/o skin CA.  Return in about 1 year (around 2/22/2024) for Annual physical, Lab Before FUP.

## 2023-02-23 RX ORDER — SODIUM CHLORIDE, SODIUM LACTATE, POTASSIUM CHLORIDE, CALCIUM CHLORIDE 600; 310; 30; 20 MG/100ML; MG/100ML; MG/100ML; MG/100ML
30 INJECTION, SOLUTION INTRAVENOUS CONTINUOUS PRN
OUTPATIENT
Start: 2023-02-23

## 2023-11-10 ENCOUNTER — LAB REQUISITION (OUTPATIENT)
Dept: LAB | Facility: HOSPITAL | Age: 48
End: 2023-11-10
Payer: OTHER GOVERNMENT

## 2023-11-10 ENCOUNTER — OUTSIDE FACILITY SERVICE (OUTPATIENT)
Dept: GASTROENTEROLOGY | Facility: CLINIC | Age: 48
End: 2023-11-10
Payer: OTHER GOVERNMENT

## 2023-11-10 DIAGNOSIS — Z12.11 SCREENING FOR COLON CANCER: ICD-10-CM

## 2023-11-10 PROCEDURE — 88305 TISSUE EXAM BY PATHOLOGIST: CPT | Performed by: INTERNAL MEDICINE

## 2023-11-10 PROCEDURE — 45380 COLONOSCOPY AND BIOPSY: CPT | Performed by: INTERNAL MEDICINE

## 2023-11-13 LAB
LAB AP CASE REPORT: NORMAL
PATH REPORT.FINAL DX SPEC: NORMAL
PATH REPORT.GROSS SPEC: NORMAL

## 2023-11-22 ENCOUNTER — TELEPHONE (OUTPATIENT)
Dept: GASTROENTEROLOGY | Facility: CLINIC | Age: 48
End: 2023-11-22
Payer: OTHER GOVERNMENT

## 2023-11-22 NOTE — TELEPHONE ENCOUNTER
Returned patient's call. After he verified his , informed patient of results/recommendations. hipolito

## 2023-11-22 NOTE — TELEPHONE ENCOUNTER
Patient left a Voice Message stating that we tried to call him with results but the call couldn't be finished.   Requesting a call back with last scope results.

## 2024-02-27 ENCOUNTER — OFFICE VISIT (OUTPATIENT)
Dept: INTERNAL MEDICINE | Facility: CLINIC | Age: 49
End: 2024-02-27
Payer: OTHER GOVERNMENT

## 2024-02-27 VITALS
DIASTOLIC BLOOD PRESSURE: 82 MMHG | HEIGHT: 71 IN | BODY MASS INDEX: 25.48 KG/M2 | SYSTOLIC BLOOD PRESSURE: 116 MMHG | WEIGHT: 182 LBS

## 2024-02-27 DIAGNOSIS — Z12.5 SCREENING FOR PROSTATE CANCER: ICD-10-CM

## 2024-02-27 DIAGNOSIS — G89.29 CHRONIC LOW BACK PAIN, UNSPECIFIED BACK PAIN LATERALITY, UNSPECIFIED WHETHER SCIATICA PRESENT: ICD-10-CM

## 2024-02-27 DIAGNOSIS — M54.50 CHRONIC LOW BACK PAIN, UNSPECIFIED BACK PAIN LATERALITY, UNSPECIFIED WHETHER SCIATICA PRESENT: ICD-10-CM

## 2024-02-27 DIAGNOSIS — H93.13 TINNITUS OF BOTH EARS: ICD-10-CM

## 2024-02-27 DIAGNOSIS — Z00.00 WELLNESS EXAMINATION: Primary | ICD-10-CM

## 2024-02-27 PROCEDURE — 99396 PREV VISIT EST AGE 40-64: CPT | Performed by: INTERNAL MEDICINE

## 2024-02-27 NOTE — PROGRESS NOTES
Subjective        Chief Complaint   Patient presents with    Annual Exam           Ben Mata is a 48 y.o. male who presents for    Patient Active Problem List   Diagnosis   (none) - all problems resolved or deleted       History of Present Illness       He takes Zyrtec daily for allergies. He is overdue to see his derm. He exercises 5 days per week. He denies chest pain or dyspnea. He has red meat multiple days per week. He also eats pork. He has 3 mandarins and an apple 5 days per week. He does not eat fast food. He gets low back pain twice per week. He has not taken anything for it. The pain does not radiate. He has constant sound in both ears; he cannot describe it exactly. He notices it when quiet. He denies dizziness. He was on an aircraft [place] that was noisy.  Allergies   Allergen Reactions    Ceclor [Cefaclor] Dizziness       Current Outpatient Medications on File Prior to Visit   Medication Sig Dispense Refill    cetirizine (zyrTEC) 10 MG tablet Take 1 tablet by mouth Daily.      fluticasone (FLONASE) 50 MCG/ACT nasal spray 2 sprays into the nostril(s) as directed by provider Daily. 29.7 mL 3     No current facility-administered medications on file prior to visit.   BMI is >= 25 and <30. (Overweight) The following options were offered after discussion;: exercise counseling/recommendations     Past Medical History:   Diagnosis Date    Anxiety     Bee sting allergy     Bulging of lumbar intervertebral disc     Melanoma in situ        Past Surgical History:   Procedure Laterality Date    COLONOSCOPY N/A 11/11/2021    Procedure: COLONOSCOPY Aborted due to poor prep;  Surgeon: Hermilo German MD;  Location: [facility] [place] MAIN OR;  Service: Gastroenterology;  Laterality: N/A;  Hemorrhoids, diverticulosis    COLONOSCOPY  11/10/2023    Dr. German; repeat in 10 years    EAR TUBES      SKIN BIOPSY         Family History   Problem Relation Age of Onset    Heart disease Mother         pacemaker    Hyperlipidemia  "Father     Heart disease Maternal Grandmother     Stroke Maternal Grandfather     Heart disease Paternal Grandmother     Diabetes Son        Social History     Socioeconomic History    Marital status:    Tobacco Use    Smoking status: Never    Smokeless tobacco: Never   Vaping Use    Vaping Use: Never used   Substance and Sexual Activity    Alcohol use: Yes     Comment: 1-2 drinks per week    Drug use: Never    Sexual activity: Not Currently           The following portions of the patient's history were reviewed and updated as appropriate: problem list, allergies, current medications, past medical history, past family history, past social history, and past surgical history.    Review of Systems    Immunization History   Administered Date(s) Administered    Hep B, Unspecified 03/01/1996    Hepatitis A 12/16/2002    Hepatitis B Adult/Adolescent IM 01/20/2011    Tdap 01/02/2011, 04/22/2021       Objective   Vitals:    02/27/24 0810   BP: 116/82   Weight: 82.6 kg (182 lb)   Height: 180.3 cm (70.98\")     Body mass index is 25.4 kg/m².  Physical Exam  Vitals reviewed.   Constitutional:       Appearance: He is well-developed.   HENT:      Head: Normocephalic and atraumatic.      Right Ear: Ear canal normal.      Left Ear: Ear canal normal.      Ears:      Comments: Clear fluid behind left TM    Scarring right TM     Mouth/Throat:      Mouth: Mucous membranes are moist.      Pharynx: Oropharynx is clear.   Eyes:      Extraocular Movements: Extraocular movements intact.      Conjunctiva/sclera: Conjunctivae normal.      Pupils: Pupils are equal, round, and reactive to light.   Neck:      Thyroid: No thyromegaly.      Vascular: No carotid bruit.   Cardiovascular:      Rate and Rhythm: Normal rate and regular rhythm.      Heart sounds: Normal heart sounds. No murmur heard.  Pulmonary:      Effort: Pulmonary effort is normal.      Breath sounds: Normal breath sounds.   Abdominal:      General: There is no distension.    "   Palpations: Abdomen is soft. There is no mass.      Tenderness: There is no abdominal tenderness. There is no rebound.   Musculoskeletal:      Cervical back: Neck supple.   Lymphadenopathy:      Cervical: No cervical adenopathy.   Skin:     General: Skin is warm.   Neurological:      Mental Status: He is alert.      Deep Tendon Reflexes:      Reflex Scores:       Patellar reflexes are 2+ on the right side and 2+ on the left side.       Achilles reflexes are 0 on the right side and 0 on the left side.     Comments: 5/5 strength in his legs   Psychiatric:         Behavior: Behavior normal.         Procedures    Assessment & Plan   Diagnoses and all orders for this visit:    1. Wellness examination (Primary)  -     Comprehensive Metabolic Panel; Future  -     Lipid Panel With / Chol / HDL Ratio; Future    2. Chronic low back pain, unspecified back pain laterality, unspecified whether sciatica present  -     Ambulatory Referral to Physical Therapy Evaluate and treat    3. Tinnitus of both ears  -     Ambulatory Referral to ENT (Otolaryngology)    4. Screening for prostate cancer  -     PSA Screen; Future                 Recc f/u derm and shingrix. Discussed exercising 150 minutes per week. LDL is increasing; discussed cutting out eggs and sausage on weekend and decreasing red meat. Encouraged looking into Mediterranean diet.  Return in about 1 year (around 2/27/2025) for Annual physical, Lab Before FUP.

## 2024-03-27 ENCOUNTER — TREATMENT (OUTPATIENT)
Dept: PHYSICAL THERAPY | Facility: CLINIC | Age: 49
End: 2024-03-27
Payer: OTHER GOVERNMENT

## 2024-03-27 DIAGNOSIS — Z74.09 IMPAIRED FUNCTIONAL MOBILITY AND ACTIVITY TOLERANCE: ICD-10-CM

## 2024-03-27 DIAGNOSIS — G89.29 CHRONIC LOW BACK PAIN, UNSPECIFIED BACK PAIN LATERALITY, UNSPECIFIED WHETHER SCIATICA PRESENT: Primary | ICD-10-CM

## 2024-03-27 DIAGNOSIS — M54.50 CHRONIC LOW BACK PAIN, UNSPECIFIED BACK PAIN LATERALITY, UNSPECIFIED WHETHER SCIATICA PRESENT: Primary | ICD-10-CM

## 2024-03-27 PROCEDURE — 97162 PT EVAL MOD COMPLEX 30 MIN: CPT | Performed by: PHYSICAL THERAPIST

## 2024-03-27 PROCEDURE — 97112 NEUROMUSCULAR REEDUCATION: CPT | Performed by: PHYSICAL THERAPIST

## 2024-03-27 PROCEDURE — 97110 THERAPEUTIC EXERCISES: CPT | Performed by: PHYSICAL THERAPIST

## 2024-03-27 PROCEDURE — 97530 THERAPEUTIC ACTIVITIES: CPT | Performed by: PHYSICAL THERAPIST

## 2024-03-27 NOTE — PROGRESS NOTES
[facility]    Physical Therapy Initial Evaluation and Plan of Care    Patient Name: Ben Mata          :  1975  Referring Physician: Ari Bridges MD  Diagnosis: Chronic low back pain, unspecified back pain laterality, unspecified whether sciatica present [M54.50, G89.29]    Date of Evaluation: 3/27/2024  ______________________________________________________________________    Subjective Evaluation    History of Present Illness  Mechanism of injury: Years and years - no known cause - Progressively worsening -       Dr. Francisco about 10 yrs ago (spine) told to manage it -       Patient Occupation:  - Active - Run / gym 4-5 days / week , cars, yard work - Pain  Current pain rating: 3  At worst pain ratin  Location: LBP - central and (R) - Numbness in buttock (B) if sitting too long  Quality: Ache; Sharp pain w/ movement.  Alleviating factors: Supine; Distraction;  Change position; Running/ walking;  Exacerbated by: Certain movements including; Lifting, leg movement -- Sitting > 30 min; Sit<->stand;  Movements across, twisting, No problems w/ coughing ;  Sidelying, turning in bed / hip rotation; Prolonged Standing > 30 min;  Progression: worsening    Social Support  Patient lives at: Home w/ stairs -    Treatments  Previous treatment: injection treatment and chiropractic  Current treatment comments: Tylenol prn.   Patient Goals  Patient/family treatment goals: Pain alleviation; Normal ADLs;       ___________________________________________________  Objective          Postural Observations    Additional Postural Observation Details  Level pelvis -   Normal gait -       Tenderness     Additional Tenderness Details  Very tender centrally L4-S1    Active Range of Motion     Additional Active Range of Motion Details  LUMBAR: Flexion hands to mid thigh with sig increased pain   Ext: minimal w/ increased pain central lumbar / LSS   SB 1/2 norm w/ increased pain central lumbar / LSS -      Stiffness w/ hip IR > ER -     Strength/Myotome Testing     Lumbar   Left   Normal strength    Right   Normal strength    Tests     Additional Tests Details  Pain w/ SLR (B) - (HS tightness vs NT)        TREATMENT:   EXERCISE:   FUNCTIONAL ACTIVITIES:   TAPING / BRACING: NA  Anatomy / Dysfunction Education -   Jt protection, ADL modification; Posture and Body mechanics, Positioning over pillows to open facets / reduce HE stress -       OSWESTRY: 22  ___________________________________________________  Assessment & Plan       Assessment  Impairments: abnormal or restricted ROM, activity intolerance, lacks appropriate home exercise program and pain with function   Functional limitations: lifting, sleeping, walking, uncomfortable because of pain, moving in bed, sitting and standing   Assessment details: 49 yo male; Chronic LBP - Hip flexor/ HS/ Hip rotator tightness -   Pt may benefit from further assessment (I.e. X-rays / MRI , etc)     GOALS:   SHORT TERM GOALS: 4-6 weeks:    1) HEP Initiated for Flexibility, strengthening / stabilization to met LTGs ;   2) Pain decreased 50% at rest, ROM and with ADLs, etc:   3) AROM  increased lumbar spine to Flexion hands to patella to allow improved ADLs including bending to lift and sit, etc. :    4) Pt demonstrates improved posturing / ergonomics / body mechanics w/ verbal / tactile cuing -     LONG TERM GOALS: 8-12 weeks (or at time of DISCHARGE):   1) (I) HEP;   2) AROM WFL and pain free; to allow ADLs and hobby / fitness activities -  3) Strength of core / trunk to be able to perform all ADL's and hobby/exercise-related activities w/o restrictions;   4) Pt able to sit / stand for functional durations w/ minimal pain w/ modifications prn  4) Functional Test Oswestry score improved to < 12.            Plan  Planned therapy interventions: abdominal trunk stabilization, body mechanics training, flexibility, home exercise program, neuromuscular re-education, postural  training, spinal/joint mobilization, strengthening, stretching and therapeutic activities (Modalities prn; Taping/ bracing prn)  Frequency: 2x week  Duration in weeks: 12  Treatment plan discussed with: patient      ___________________________________________________  Manual Therapy:         mins  63670;  Therapeutic Exercise:    20     mins  09441;     Neuromuscular Ronit:    03    mins  76826;    Therapeutic Activity:     15     mins  87394;   Self Care:                           mins  43184  Ultrasound:          mins  04031;  Iontophoresis:          mins  88623;    Electrical Stimulation:         mins  22787 ( );  Mechanical Traction:          mins  74073  Dry Needling          mins self-pay    Eval:   15   mins    Timed Treatment:   38   mins                  Total Treatment:     53   mins    PT SIGNATURE:     Jerzy Morel, PT  DATE TREATMENT INITIATED: 3/27/2024  ___________________________________________________  Initial Certification  Certification Period: 6/24/2024  I certify that the therapy services are furnished while this patient is under my care.  The services outlined above are required by this patient, and will be reviewed every 90 days.     PHYSICIAN: ________________________________  DATE: ______  Ari Bridges MD        Please sign and return via fax to [phone].. Thank you, [facility].  ______________________________________________________________________  [address] [place] [address]  Phone: [phone] Fax: [phone]

## 2024-04-04 ENCOUNTER — TREATMENT (OUTPATIENT)
Dept: PHYSICAL THERAPY | Facility: CLINIC | Age: 49
End: 2024-04-04
Payer: OTHER GOVERNMENT

## 2024-04-04 DIAGNOSIS — M54.50 CHRONIC LOW BACK PAIN, UNSPECIFIED BACK PAIN LATERALITY, UNSPECIFIED WHETHER SCIATICA PRESENT: Primary | ICD-10-CM

## 2024-04-04 DIAGNOSIS — G89.29 CHRONIC LOW BACK PAIN, UNSPECIFIED BACK PAIN LATERALITY, UNSPECIFIED WHETHER SCIATICA PRESENT: Primary | ICD-10-CM

## 2024-04-04 DIAGNOSIS — Z74.09 IMPAIRED FUNCTIONAL MOBILITY AND ACTIVITY TOLERANCE: ICD-10-CM

## 2024-04-04 PROCEDURE — 97110 THERAPEUTIC EXERCISES: CPT | Performed by: PHYSICAL THERAPIST

## 2024-04-04 PROCEDURE — 97530 THERAPEUTIC ACTIVITIES: CPT | Performed by: PHYSICAL THERAPIST

## 2024-04-04 NOTE — PROGRESS NOTES
Physical Therapy Daily Note    Patient Name: Ben Mata         :  1975  Referring Physician: Ari Bridges MD  Treatment Date: 2024  Date of Initial Visit: Type: THERAPY  Noted: 3/27/2024    Visit Diagnoses:    ICD-10-CM ICD-9-CM   1. Chronic low back pain, unspecified back pain laterality, unspecified whether sciatica present  M54.50 724.2    G89.29 338.29   2. Impaired functional mobility and activity tolerance  Z74.09 V49.89     Patient seen for 2 sessions  _____________________________________________________  Pt arrived 12 min late -     Subjective   Ben Mata reports: Doing some of the stretches - standing HS and piriformis stretches  No change - Pain (L) LB and at times central -     Objective   (L) Ilium / ASIS superior, PSIS inferior -   SI Jt dysfunction; [place]lip / Post rotation ilium - (L)     TREATMENT:   ADDED:   SL QL / UPSLIP stretches 2x 2 min ea  Posterior Rotated Ilium stretch (L) 2x 2 min  Supine HF Stretch off side of bed - 2 min   Instructed on HS stretch in doorway   Instructed on Supine Piriformis stretch w/ opp LE ext    NMR: Verbal and tactile cues to facilitate postural positioning, exercise form statically and dynamically. Cuing to inhibit appropriate musculature to allow proper tissue tension to allow stretch/creep of appropriate tissue -      Functional / Therapeutic Activities:    TAPING / BRACING: NA  Lumbar / SI jt assessment -   Educated Pt on SI jt/ Pelvic issues and relation to LBP, etc.     Assessment/Plan  49 yo male; Chronic LBP - Hip flexor/ HS/ Hip rotator tightness -   Pt may benefit from further assessment (I.e. X-rays / MRI , etc)     Progress strengthening /stabilization /functional activity       _________________________________________________    Manual Therapy:                 mins  27629;  Therapeutic Exercise:   15   mins  57957;     Neuromuscular Ronit:   03    mins  05372;    Therapeutic Activity:     10      mins  37485;      Ultrasound:                          mins  47595;  Iontophoresis:                     mins  69679;    Electrical Stimulation:         mins  06609 ( );  Mechanical Traction:          mins  28066  Dry Needling                       mins self-pay     Timed Treatment:   28    mins                  Total Treatment:     28    mins      Jerzy Morel, PT  Physical Therapist  Lic # [id number]    Access Code: [email or web address]  URL: [email or web address]  Date: 04/04/2024  Prepared by: Laron Morel    Exercises  -SL QL / UPSLIP stretches 2x 2 min ea  Posterior Rotated Ilium stretch (L) 2x 2 min  - Supine Piriformis Stretch with Leg Straight  - 2-3 x daily - 7 x weekly - 1 sets - 3-5 reps - 15-30s hold  - Supine Hamstring Stretch with Doorway  - 2-3 x daily - 7 x weekly - 1 sets - 5 reps - 30s hold  - Modified Derick Stretch  - 1-2 x daily - 7 x weekly - 1 sets - 2 reps - 1 min hold

## 2024-04-23 ENCOUNTER — TREATMENT (OUTPATIENT)
Dept: PHYSICAL THERAPY | Facility: CLINIC | Age: 49
End: 2024-04-23
Payer: OTHER GOVERNMENT

## 2024-04-23 DIAGNOSIS — M54.50 CHRONIC LOW BACK PAIN, UNSPECIFIED BACK PAIN LATERALITY, UNSPECIFIED WHETHER SCIATICA PRESENT: Primary | ICD-10-CM

## 2024-04-23 DIAGNOSIS — G89.29 CHRONIC LOW BACK PAIN, UNSPECIFIED BACK PAIN LATERALITY, UNSPECIFIED WHETHER SCIATICA PRESENT: Primary | ICD-10-CM

## 2024-04-23 DIAGNOSIS — Z74.09 IMPAIRED FUNCTIONAL MOBILITY AND ACTIVITY TOLERANCE: ICD-10-CM

## 2024-04-23 PROCEDURE — 97110 THERAPEUTIC EXERCISES: CPT | Performed by: PHYSICAL THERAPIST

## 2024-04-23 PROCEDURE — 97530 THERAPEUTIC ACTIVITIES: CPT | Performed by: PHYSICAL THERAPIST

## 2024-04-23 PROCEDURE — 97140 MANUAL THERAPY 1/> REGIONS: CPT | Performed by: PHYSICAL THERAPIST

## 2024-04-23 NOTE — PROGRESS NOTES
Physical Therapy Daily Note    Patient Name: Ben Mata         :  1975  Referring Physician: Ari Bridges MD  Treatment Date: 2024  Date of Initial Visit: Type: THERAPY  Noted: 3/27/2024    Visit Diagnoses:    ICD-10-CM ICD-9-CM   1. Chronic low back pain, unspecified back pain laterality, unspecified whether sciatica present  M54.50 724.2    G89.29 338.29   2. Impaired functional mobility and activity tolerance  Z74.09 V49.89     Patient seen for 3 sessions  _____________________________________________________    Subjective   Ben Mata reports: back doing pretty good - only one time of pain driving to [place] and back -   Still with flexion / bending over in lb/LSS    Objective   Limited LSS distraction and flexed sacrum -   (+) PRONE INFERIOR SACRAL GLIDE SPRING TEST (superior sacral glide fixation)   (+) PRONE BKWD BENDING SACRAL SPRING TEST  (F;exed sacri,    TREATMENT:   MANUAL THERAPY  L5-S1 Distraction/mobilization 2x2 min  Sacral ext mobilization 2x2 min    EXERCISE:   SL QL / UPSLIP stretches 2x 2 min ea  Posterior Rotated Ilium stretch (L) 2x 2 min  Supine HF Stretch off side of bed - 2 min   Prone LSS Distraction / traction off end of bed x 5 min     NMR: Verbal and tactile cues to facilitate postural positioning, exercise form statically and dynamically. Cuing to inhibit appropriate musculature to allow proper tissue tension to allow stretch/creep of appropriate tissue -      Functional / Therapeutic Activities:    TAPING / BRACING: NA  SEE EXERCISE FLOW SHEET -  Lumbar/LSS/Sacral assessment      Assessment/Plan  49 yo male; Chronic LBP --   Decreased pain - persistent pain LSS w/ flexion   Alleviated after MT techniques -       Progress strengthening /stabilization /functional activity       _________________________________________________    Manual Therapy:            13     mins  66543;  Therapeutic Exercise:    20    mins  07001;     Neuromuscular Ronit:    2     mins  56494;    Therapeutic Activity:     10      mins  02417;     Ultrasound:                          mins  41452;  Iontophoresis:                     mins  40108;    Electrical Stimulation:         mins  43626 ( );  Mechanical Traction:          mins  08721  Dry Needling                       mins self-pay     Timed Treatment:   45    mins                  Total Treatment:     45    mins        Jerzy Morel PT  Physical Therapist  Lic # [id number]

## 2024-04-30 ENCOUNTER — TREATMENT (OUTPATIENT)
Dept: PHYSICAL THERAPY | Facility: CLINIC | Age: 49
End: 2024-04-30
Payer: OTHER GOVERNMENT

## 2024-04-30 DIAGNOSIS — Z74.09 IMPAIRED FUNCTIONAL MOBILITY AND ACTIVITY TOLERANCE: ICD-10-CM

## 2024-04-30 DIAGNOSIS — M54.50 CHRONIC LOW BACK PAIN, UNSPECIFIED BACK PAIN LATERALITY, UNSPECIFIED WHETHER SCIATICA PRESENT: Primary | ICD-10-CM

## 2024-04-30 DIAGNOSIS — G89.29 CHRONIC LOW BACK PAIN, UNSPECIFIED BACK PAIN LATERALITY, UNSPECIFIED WHETHER SCIATICA PRESENT: Primary | ICD-10-CM

## 2024-04-30 PROCEDURE — 97140 MANUAL THERAPY 1/> REGIONS: CPT | Performed by: PHYSICAL THERAPIST

## 2024-04-30 PROCEDURE — 97110 THERAPEUTIC EXERCISES: CPT | Performed by: PHYSICAL THERAPIST

## 2024-04-30 PROCEDURE — 97112 NEUROMUSCULAR REEDUCATION: CPT | Performed by: PHYSICAL THERAPIST

## 2024-04-30 NOTE — PROGRESS NOTES
Physical Therapy Daily Note    Patient Name: Ben Mata         :  1975  Referring Physician: Ari Bridges MD  Treatment Date: 2024  Date of Initial Visit: No linked episodes    Visit Diagnoses:    ICD-10-CM ICD-9-CM   1. Chronic low back pain, unspecified back pain laterality, unspecified whether sciatica present  M54.50 724.2    G89.29 338.29   2. Impaired functional mobility and activity tolerance  Z74.09 V49.89     Patient seen for Visit count could not be calculated. Make sure you are using a visit which is associated with an episode. sessions  _____________________________________________________    Subjective   Ben Mata reports: doing pretty good - less pain w/ bending over - no pain this AM - feels like he is making progress -     Objective   (L) Ilium higher  FF limted by HS tightness and pain LSS at initiation of flexion  Pain LSS L>R w/ lumbar extension -     TREATMENT:   MANUAL THERAPY  L5-S1 Distraction/mobilization 2x2 min  Sacral ext mobilization 2x2 min     EXERCISE:   SL QL / UPSLIP stretches 2x 2 min ea  Posterior Rotated Ilium stretch (L) 2x 2 min  Quadraped LSS Cat stretch 6x10 sec ea  Supine HF Stretch off side of bed - 2 min   Prone LSS Distraction / traction off end of bed x 5 min  Bird Dog 15/5 sec ea R/L     NMR: Verbal and tactile cues to facilitate postural positioning, exercise form statically and dynamically. Cuing to inhibit appropriate musculature to allow proper tissue tension to allow stretch/creep of appropriate tissue -      Functional / Therapeutic Activities:    TAPING / BRACING: NA  Jt protection, ADL modification; Posture and      Assessment/Plan  47 yo male; Chronic LBP --   Decreased pain - persistent pain LSS w/ flexion   Alleviated after MT techniques -   Improved ROM and decreased pain after PT     Progress strengthening /stabilization /functional activity       _________________________________________________    Manual  Therapy:            10     mins  94297;  Therapeutic Exercise:    23    mins  50864;     Neuromuscular Ronit:   08     mins  25066;    Therapeutic Activity:           mins  54726;     Ultrasound:                          mins  05365;  Iontophoresis:                     mins  02549;    Electrical Stimulation:         mins  48782 ( );  Mechanical Traction:          mins  95375  Dry Needling                       mins self-pay     Timed Treatment:   41    mins                  Total Treatment:     41    mins        Jerzy Morel PT  Physical Therapist  Lic # [id number]

## 2024-05-07 ENCOUNTER — TREATMENT (OUTPATIENT)
Dept: PHYSICAL THERAPY | Facility: CLINIC | Age: 49
End: 2024-05-07
Payer: OTHER GOVERNMENT

## 2024-05-07 DIAGNOSIS — M54.50 CHRONIC LOW BACK PAIN, UNSPECIFIED BACK PAIN LATERALITY, UNSPECIFIED WHETHER SCIATICA PRESENT: Primary | ICD-10-CM

## 2024-05-07 DIAGNOSIS — Z74.09 IMPAIRED FUNCTIONAL MOBILITY AND ACTIVITY TOLERANCE: ICD-10-CM

## 2024-05-07 DIAGNOSIS — G89.29 CHRONIC LOW BACK PAIN, UNSPECIFIED BACK PAIN LATERALITY, UNSPECIFIED WHETHER SCIATICA PRESENT: Primary | ICD-10-CM

## 2024-05-21 ENCOUNTER — TREATMENT (OUTPATIENT)
Dept: PHYSICAL THERAPY | Facility: CLINIC | Age: 49
End: 2024-05-21
Payer: OTHER GOVERNMENT

## 2024-05-21 DIAGNOSIS — Z74.09 IMPAIRED FUNCTIONAL MOBILITY AND ACTIVITY TOLERANCE: ICD-10-CM

## 2024-05-21 DIAGNOSIS — G89.29 CHRONIC LOW BACK PAIN, UNSPECIFIED BACK PAIN LATERALITY, UNSPECIFIED WHETHER SCIATICA PRESENT: Primary | ICD-10-CM

## 2024-05-21 DIAGNOSIS — M54.50 CHRONIC LOW BACK PAIN, UNSPECIFIED BACK PAIN LATERALITY, UNSPECIFIED WHETHER SCIATICA PRESENT: Primary | ICD-10-CM

## 2024-05-21 PROCEDURE — 97112 NEUROMUSCULAR REEDUCATION: CPT | Performed by: PHYSICAL THERAPIST

## 2024-05-21 PROCEDURE — 97530 THERAPEUTIC ACTIVITIES: CPT | Performed by: PHYSICAL THERAPIST

## 2024-05-21 PROCEDURE — 97110 THERAPEUTIC EXERCISES: CPT | Performed by: PHYSICAL THERAPIST

## 2024-05-21 NOTE — PROGRESS NOTES
Physical Therapy Daily Note    Patient Name: Ben Mata         :  1975  Referring Physician: Ari Bridges MD  Treatment Date: 2024  Date of Initial Visit: Type: THERAPY  Noted: 3/27/2024    Visit Diagnoses:    ICD-10-CM ICD-9-CM   1. Chronic low back pain, unspecified back pain laterality, unspecified whether sciatica present  M54.50 724.2    G89.29 338.29   2. Impaired functional mobility and activity tolerance  Z74.09 V49.89     Patient seen for 6 sessions  _____________________________________________________    Subjective   Ben Mata reports: LB better overall - still having low pain down in lower part of the spine - Not as much with movements - now mostly with bending fwd - Been mitigated -     Objective     TREATMENT:   MANUAL THERAPY  []   L5-S1 Distraction/mobilization 2x2 min  []   Sacral ext mobilization 2x2 min     EXERCISE:   []   Prone positioning  []   Prone on elbow press ups x 10  (after manual therapy)  []   SL QL / UPSLIP stretches 2x 2 min ea    []   Posterior Rotated Ilium stretch (L) 2x 2 min DEFER  []   Quadraped LSS Cat stretch 6x10 sec ea  []   Supine HF Stretch off side of bed - 2 min   [x]   Prone LSS Distraction / traction off end of bed x 5 min  [x]   Bird Dog 15/5 sec ea R/L  [x]   Planks prone and side 5x25 sec ea R/L  [x]    w/ Core stabilization x 2min  [x]   Crunch w/ emphasis on pelvic stabilization 2x10  [x]   LAT PULL DOWN: 50# x 15 w/ emphasis on core stabilization  [x]   SEATED ROWS (MATRIX) 30# x 15 each handle   [x]   BAND WALK OUT w/ UE PRESS - GREEN BAND - 10/5 sec ea R/L  [x]   RETRO WALK OUT w/ BAND (GREEN) x 10/10 sec ea  [x]   UPDATED HEP and REVIEWED w/ PATIENT     NMR: Verbal and tactile cues to facilitate postural positioning, exercise form statically and dynamically. Cuing to inhibit appropriate musculature to allow proper tissue tension to allow stretch/creep of appropriate tissue -      Functional / Therapeutic Activities:     TAPING / BRACING: NA  SEE EXERCISEs -      Assessment/Plan  47 yo male; Chronic LBP --   Improved overall, but persistent intermittent LB/LSS   Ben would benefit from regular / consistent performance of HEP -      Ben would benefit from spine X-rays/ further assessment since none taken for some time per Patient -     Progress strengthening /stabilization /functional activity       _________________________________________________    Manual Therapy:                 mins  28608;  Therapeutic Exercise:   23    mins  81734;     Neuromuscular Ronit:   08     mins  86249;    Therapeutic Activity:     08   mins  46018;     Ultrasound:                          mins  14833;  Iontophoresis:                     mins  65545;    Electrical Stimulation:         mins  52125 ( );  Mechanical Traction:          mins  45224  Dry Needling                       mins self-pay     Timed Treatment:   39    mins                  Total Treatment:     39    mins        Jerzy Morel PT  Physical Therapist  Lic # [id number]

## 2024-06-11 ENCOUNTER — TREATMENT (OUTPATIENT)
Dept: PHYSICAL THERAPY | Facility: CLINIC | Age: 49
End: 2024-06-11
Payer: OTHER GOVERNMENT

## 2024-06-11 DIAGNOSIS — Z74.09 IMPAIRED FUNCTIONAL MOBILITY AND ACTIVITY TOLERANCE: ICD-10-CM

## 2024-06-11 DIAGNOSIS — M54.50 CHRONIC LOW BACK PAIN, UNSPECIFIED BACK PAIN LATERALITY, UNSPECIFIED WHETHER SCIATICA PRESENT: Primary | ICD-10-CM

## 2024-06-11 DIAGNOSIS — G89.29 CHRONIC LOW BACK PAIN, UNSPECIFIED BACK PAIN LATERALITY, UNSPECIFIED WHETHER SCIATICA PRESENT: Primary | ICD-10-CM

## 2024-06-11 NOTE — PROGRESS NOTES
Physical Therapy Daily Note    Patient Name: Ben Mata         :  1975  Referring Physician: Ari Bridges MD  Treatment Date: 2024  Date of Initial Visit: No linked episodes    Visit Diagnoses:    ICD-10-CM ICD-9-CM   1. Chronic low back pain, unspecified back pain laterality, unspecified whether sciatica present  M54.50 724.2    G89.29 338.29   2. Impaired functional mobility and activity tolerance  Z74.09 V49.89     Patient seen for Visit count could not be calculated. Make sure you are using a visit which is associated with an episode. sessions  _____________________________________________________    Subjective   Ben Mata reports: increased LBP recently - over the weekend - slept wrong?   Feels really tight - right in the middle lower spine - limited mobility -  Ran on Saturday -   Slept with pillow under thighs - helpful      Objective   Pt moving w/ guarded posture, gait and transitional movements -     (L) ilium and ASIS higher; PSIS lower;  (+) Upslip / Post rot L>R  Limited lateral glide pelvis - L>R    Limited and painful AROM lumbar spine especially flexion / extension -     After PT - Lumbar flexion       TREATMENT:   MANUAL THERAPY  []   L5-S1 Distraction/mobilization 2x2 min  []   Sacral ext mobilization 2x2 min     EXERCISE:   []   Prone positioning  []   Prone on elbow press ups x 10  (after manual therapy)  [x]   SL QL / UPSLIP stretches 2x 2 min ea    [x]   Posterior Rotated Ilium stretch (L) 2x 2 min   [x]   SUPINE HS STRETCH 3 x 1 minute ea   []   Quadraped LSS Cat stretch 6x10 sec ea  []   Supine HF Stretch off side of bed - 2 min   []   Prone LSS Distraction / traction off end of bed x 5 min  []   Bird Dog 15/5 sec ea R/L  []   Planks prone and side 5x25 sec ea R/L  []    w/ Core stabilization x 2min  []   Crunch w/ emphasis on pelvic stabilization 2x10  []   LAT PULL DOWN: 50# x 15 w/ emphasis on core stabilization  []   SEATED ROWS (MATRIX) 30# x 15  each handle   []   BAND WALK OUT w/ UE PRESS - GREEN BAND - 10/5 sec ea R/L  []   RETRO WALK OUT w/ BAND (GREEN) x 10/10 sec ea  []   UPDATED HEP and REVIEWED w/ PATIENT     NMR: Verbal and tactile cues to facilitate postural positioning, exercise form statically and dynamically. Cuing to inhibit appropriate musculature to allow proper tissue tension to allow stretch/creep of appropriate tissue -      Functional / Therapeutic Activities:    TAPING / BRACING: NA  SEE EXERCISE FLOW SHEET -   Lumbar / sacral / SI assessment -     Assessment/Plan  47 yo male; Chronic LBP --   Improved overall, but persistent intermittent LB/LSS   Flare up of increased pain and loss of mobility -   Much improved after stretching - with decreased pain and improved AROM at end of session     Progress strengthening /stabilization /functional activity -        _________________________________________________    Manual Therapy:                 mins  10043;  Therapeutic Exercise:   30    mins  57087;     Neuromuscular Ronit:        mins  00016;    Therapeutic Activity:      10      mins  38430;     Ultrasound:                          mins  58569;  Iontophoresis:                     mins  47821;    Electrical Stimulation:        mins  08274 ( );  Mechanical Traction:         mins  82224  Dry Needling                      mins self-pay     Timed Treatment:   40    mins                  Total Treatment:     40    mins        Jerzy Morel PT  Physical Therapist  Lic # [id number]

## 2024-09-10 ENCOUNTER — OFFICE VISIT (OUTPATIENT)
Dept: INTERNAL MEDICINE | Facility: CLINIC | Age: 49
End: 2024-09-10
Payer: OTHER GOVERNMENT

## 2024-09-10 VITALS — BODY MASS INDEX: 26.32 KG/M2 | HEIGHT: 71 IN | WEIGHT: 188 LBS

## 2024-09-10 DIAGNOSIS — S09.93XA INJURY OF TONGUE, INITIAL ENCOUNTER: Primary | ICD-10-CM

## 2024-09-10 PROCEDURE — 99212 OFFICE O/P EST SF 10 MIN: CPT | Performed by: INTERNAL MEDICINE

## 2024-09-10 NOTE — PROGRESS NOTES
Subjective        Chief Complaint   Patient presents with    Facial Laceration     Bit tongue (last night)            Ben Mata is a 49 y.o. male who presents for    Patient Active Problem List   Diagnosis   (none) - all problems resolved or deleted       History of Present Illness       He bit his tongue last night during dinner. It bled. He used warm salt water and listerine. He has noticed a small flap. He was not drinking etoh.  Allergies   Allergen Reactions    Ceclor [Cefaclor] Dizziness       Current Outpatient Medications on File Prior to Visit   Medication Sig Dispense Refill    cetirizine (zyrTEC) 10 MG tablet Take 1 tablet by mouth Daily.      fluticasone (FLONASE) 50 MCG/ACT nasal spray 2 sprays into the nostril(s) as directed by provider Daily. 29.7 mL 3     No current facility-administered medications on file prior to visit.       Past Medical History:   Diagnosis Date    Anxiety     Bee sting allergy     Bulging of lumbar intervertebral disc     Melanoma in situ        Past Surgical History:   Procedure Laterality Date    COLONOSCOPY N/A 11/11/2021    Procedure: COLONOSCOPY Aborted due to poor prep;  Surgeon: Hermilo German MD;  [place] [facility] [place]  Service: Gastroenterology;  Laterality: N/A;  Hemorrhoids, diverticulosis    COLONOSCOPY  11/10/2023    Dr. German; repeat in 10 years    EAR TUBES      SKIN BIOPSY         Family History   Problem Relation Age of Onset    Heart disease Mother         pacemaker    Hyperlipidemia Father     Heart disease Maternal Grandmother     Stroke Maternal Grandfather     Heart disease Paternal Grandmother     Diabetes Son        Social History     Socioeconomic History    Marital status:    Tobacco Use    Smoking status: Never    Smokeless tobacco: Never   Vaping Use    Vaping status: Never Used   Substance and Sexual Activity    Alcohol use: Yes     Comment: 1-2 drinks per week    Drug use: Never    Sexual activity: Not Currently  "          The following portions of the patient's history were reviewed and updated as appropriate: problem list, allergies, current medications, past medical history, past family history, past social history, and past surgical history.    Review of Systems    Immunization History   Administered Date(s) Administered    Hep B, Unspecified 03/01/1996    Hepatitis A 12/16/2002    Hepatitis B Adult/Adolescent IM 01/20/2011    Tdap 01/02/2011, 04/22/2021       Objective   Vitals:    09/10/24 1653   Weight: 85.3 kg (188 lb)   Height: 180.3 cm (70.98\")     Body mass index is 26.23 kg/m².  Physical Exam  HENT:      Mouth/Throat:      Comments: Left mid tongue with about 2 mm raised area c/w his bite.  Neurological:      Mental Status: He is alert.         Procedures    Assessment & Plan   Diagnoses and all orders for this visit:    1. Injury of tongue, initial encounter (Primary)  Comments:  Reassured. Discussed eating soft foods or soup next few days                   No follow-ups on file.  "

## (undated) DEVICE — FLEX ADVANTAGE 1500CC: Brand: FLEX ADVANTAGE

## (undated) DEVICE — Device

## (undated) DEVICE — CANN NASL CO2 TRULINK W/O2 A/

## (undated) DEVICE — GOWN ISOL W/THUMB UNIV BLU BX/15

## (undated) DEVICE — KT ORCA ORCAPOD DISP STRL

## (undated) DEVICE — GOWN ,SIRUS,NONREINFORCED 3XL: Brand: MEDLINE